# Patient Record
(demographics unavailable — no encounter records)

---

## 2017-07-28 NOTE — RADRPT
EXAM DATE/TIME:  07/28/2017 13:51 

 

[facility] COMPARISON:     

No previous studies available for comparison.

 

                     

INDICATIONS :     

Fatigue.

                     

 

MEDICAL HISTORY :     

None.          

 

SURGICAL HISTORY :     

None.   

 

ENCOUNTER:     

Initial                                        

 

ACUITY:     

>1 year      

 

PAIN SCORE:     

0/10

 

LOCATION:     

Bilateral chest 

 

FINDINGS:     

PA and lateral views of the chest demonstrate the lungs to be symmetrically aerated without evidence 
of mass, infiltrate or effusion.  The cardiomediastinal contours are unremarkable.  Osseous structure
s are intact.

 

CONCLUSION:     No acute disease.  

 

 

 

 Anthony Garrett MD on July 28, 2017 at 14:17           

Board Certified Radiologist.

 This report was verified electronically.